# Patient Record
Sex: FEMALE | Employment: UNEMPLOYED | URBAN - METROPOLITAN AREA
[De-identification: names, ages, dates, MRNs, and addresses within clinical notes are randomized per-mention and may not be internally consistent; named-entity substitution may affect disease eponyms.]

---

## 2018-10-23 ENCOUNTER — HOSPITAL ENCOUNTER (OUTPATIENT)
Dept: LAB | Age: 58
Discharge: HOME | End: 2018-10-23
Payer: COMMERCIAL

## 2018-10-23 ENCOUNTER — OFFICE VISIT (OUTPATIENT)
Dept: INTERNAL MEDICINE CLINIC | Age: 58
End: 2018-10-23

## 2018-10-23 VITALS
WEIGHT: 149 LBS | RESPIRATION RATE: 16 BRPM | HEIGHT: 61 IN | TEMPERATURE: 97.9 F | HEART RATE: 74 BPM | BODY MASS INDEX: 28.13 KG/M2 | OXYGEN SATURATION: 99 % | SYSTOLIC BLOOD PRESSURE: 155 MMHG | DIASTOLIC BLOOD PRESSURE: 81 MMHG

## 2018-10-23 DIAGNOSIS — K63.5 POLYP OF COLON, UNSPECIFIED PART OF COLON, UNSPECIFIED TYPE: ICD-10-CM

## 2018-10-23 DIAGNOSIS — K21.9 GASTROESOPHAGEAL REFLUX DISEASE WITHOUT ESOPHAGITIS: ICD-10-CM

## 2018-10-23 DIAGNOSIS — I10 ESSENTIAL HYPERTENSION: ICD-10-CM

## 2018-10-23 DIAGNOSIS — Z78.0 POSTMENOPAUSAL: ICD-10-CM

## 2018-10-23 DIAGNOSIS — M54.50 LUMBAR SPINE PAIN: Primary | ICD-10-CM

## 2018-10-23 DIAGNOSIS — K57.90 DIVERTICULOSIS OF INTESTINE WITHOUT BLEEDING, UNSPECIFIED INTESTINAL TRACT LOCATION: ICD-10-CM

## 2018-10-23 DIAGNOSIS — I34.1 MITRAL VALVE PROLAPSE: ICD-10-CM

## 2018-10-23 DIAGNOSIS — Z90.710 STATUS POST HYSTERECTOMY: ICD-10-CM

## 2018-10-23 DIAGNOSIS — Z00.00 WELLNESS EXAMINATION: ICD-10-CM

## 2018-10-23 DIAGNOSIS — Z87.39 HISTORY OF HERNIATED INTERVERTEBRAL DISC: ICD-10-CM

## 2018-10-23 LAB
BASOPHILS # BLD: 0 K/UL (ref 0–0.1)
BASOPHILS NFR BLD: 0 % (ref 0–2)
DIFFERENTIAL METHOD BLD: ABNORMAL
EOSINOPHIL # BLD: 0.1 K/UL (ref 0–0.4)
EOSINOPHIL NFR BLD: 1 % (ref 0–5)
ERYTHROCYTE [DISTWIDTH] IN BLOOD BY AUTOMATED COUNT: 13.4 % (ref 11.6–14.5)
HCT VFR BLD AUTO: 40.4 % (ref 35–45)
HGB BLD-MCNC: 12.8 G/DL (ref 12–16)
LYMPHOCYTES # BLD: 2.7 K/UL (ref 0.9–3.6)
LYMPHOCYTES NFR BLD: 39 % (ref 21–52)
MCH RBC QN AUTO: 28.1 PG (ref 24–34)
MCHC RBC AUTO-ENTMCNC: 31.7 G/DL (ref 31–37)
MCV RBC AUTO: 88.8 FL (ref 74–97)
MONOCYTES # BLD: 0.3 K/UL (ref 0.05–1.2)
MONOCYTES NFR BLD: 4 % (ref 3–10)
NEUTS SEG # BLD: 3.9 K/UL (ref 1.8–8)
NEUTS SEG NFR BLD: 56 % (ref 40–73)
PLATELET # BLD AUTO: 277 K/UL (ref 135–420)
PMV BLD AUTO: 12.6 FL (ref 9.2–11.8)
RBC # BLD AUTO: 4.55 M/UL (ref 4.2–5.3)
WBC # BLD AUTO: 6.9 K/UL (ref 4.6–13.2)

## 2018-10-23 PROCEDURE — 85025 COMPLETE CBC W/AUTO DIFF WBC: CPT | Performed by: INTERNAL MEDICINE

## 2018-10-23 PROCEDURE — 80061 LIPID PANEL: CPT | Performed by: INTERNAL MEDICINE

## 2018-10-23 PROCEDURE — 82652 VIT D 1 25-DIHYDROXY: CPT | Performed by: INTERNAL MEDICINE

## 2018-10-23 PROCEDURE — 86803 HEPATITIS C AB TEST: CPT | Performed by: INTERNAL MEDICINE

## 2018-10-23 PROCEDURE — 80053 COMPREHEN METABOLIC PANEL: CPT | Performed by: INTERNAL MEDICINE

## 2018-10-23 PROCEDURE — 36415 COLL VENOUS BLD VENIPUNCTURE: CPT | Performed by: INTERNAL MEDICINE

## 2018-10-23 RX ORDER — PANTOPRAZOLE SODIUM 40 MG/1
40 TABLET, DELAYED RELEASE ORAL DAILY
COMMUNITY
End: 2018-10-23 | Stop reason: SDUPTHER

## 2018-10-23 RX ORDER — DILTIAZEM HYDROCHLORIDE 240 MG/1
240 CAPSULE, EXTENDED RELEASE ORAL
Refills: 1 | COMMUNITY
Start: 2018-09-23 | End: 2018-10-23 | Stop reason: SDUPTHER

## 2018-10-23 RX ORDER — PANTOPRAZOLE SODIUM 40 MG/1
40 TABLET, DELAYED RELEASE ORAL DAILY
Qty: 90 TAB | Refills: 3 | Status: SHIPPED | OUTPATIENT
Start: 2018-10-23

## 2018-10-23 RX ORDER — DICLOFENAC SODIUM 10 MG/G
2 GEL TOPICAL
Qty: 100 G | Refills: 1 | Status: SHIPPED | OUTPATIENT
Start: 2018-10-23 | End: 2018-11-06 | Stop reason: ALTCHOICE

## 2018-10-23 RX ORDER — DILTIAZEM HYDROCHLORIDE 240 MG/1
240 CAPSULE, EXTENDED RELEASE ORAL
Qty: 90 CAP | Refills: 1 | Status: SHIPPED | OUTPATIENT
Start: 2018-10-23
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
## 2018-10-23 NOTE — PROGRESS NOTES
ROOM # 1 Mary Lewis presents today for Chief Complaint Patient presents with  Back Pain Patient reports pain has been going on about a month. Worse in the morning. Tatyana OteroCharisse preferred language for health care discussion is english/other. Is someone accompanying this pt? Yes, daughter accompanied patient into exam room. Is the patient using any DME equipment during OV? No 
 
Depression Screening: PHQ over the last two weeks 10/23/2018 Little interest or pleasure in doing things Not at all Feeling down, depressed, irritable, or hopeless Not at all Total Score PHQ 2 0 Learning Assessment: 
No flowsheet data found. Abuse Screening: No flowsheet data found. Fall Risk No flowsheet data found. Visit Vitals /81 (BP 1 Location: Right arm, BP Patient Position: Sitting) Pulse 74 Temp 97.9 °F (36.6 °C) (Oral) Resp 16 Ht 5' 1\" (1.549 m) Wt 149 lb (67.6 kg) SpO2 99% BMI 28.15 kg/m² Health Maintenance reviewed and discussed per provider. Yes Mary Lewis is due for Health Maintenance Due Topic Date Due  
 Hepatitis C Screening  1960  
 DTaP/Tdap/Td series (1 - Tdap) 02/13/1982  PAP AKA CERVICAL CYTOLOGY  02/13/1982  Shingrix Vaccine Age 50> (1 of 2) 02/13/2011  BREAST CANCER SCRN MAMMOGRAM  02/13/2011  
 FOBT Q 1 YEAR AGE 50-75  02/13/2011  Influenza Age 5 to Adult  08/01/2018 Please order/place referral if appropriate. Advance Directive: 1. Do you have an advance directive in place? Patient Reply: No 
 
2. If not, would you like material regarding how to put one in place? Patient Reply: No 
 
Coordination of Care: 1. Have you been to the ER, urgent care clinic since your last visit? Hospitalized since your last visit? NO 
 
2. Have you seen or consulted any other health care providers outside of the [address] [place] [address] since your last visit?  Include any pap smears or colon screening.  No

## 2018-10-24 LAB
ALBUMIN SERPL-MCNC: 4.5 G/DL (ref 3.4–5)
ALBUMIN/GLOB SERPL: 1.2 {RATIO} (ref 0.8–1.7)
ALP SERPL-CCNC: 142 U/L (ref 45–117)
ALT SERPL-CCNC: 27 U/L (ref 13–56)
ANION GAP SERPL CALC-SCNC: 8 MMOL/L (ref 3–18)
AST SERPL-CCNC: 23 U/L (ref 15–37)
BILIRUB SERPL-MCNC: 0.2 MG/DL (ref 0.2–1)
BUN SERPL-MCNC: 11 MG/DL (ref 7–18)
BUN/CREAT SERPL: 13 (ref 12–20)
CALCIUM SERPL-MCNC: 9.3 MG/DL (ref 8.5–10.1)
CHLORIDE SERPL-SCNC: 106 MMOL/L (ref 100–108)
CHOLEST SERPL-MCNC: 175 MG/DL
CO2 SERPL-SCNC: 28 MMOL/L (ref 21–32)
CREAT SERPL-MCNC: 0.82 MG/DL (ref 0.6–1.3)
GLOBULIN SER CALC-MCNC: 3.7 G/DL (ref 2–4)
GLUCOSE SERPL-MCNC: 91 MG/DL (ref 74–99)
HCV AB SER IA-ACNC: <0.02 INDEX
HCV AB SERPL QL IA: NEGATIVE
HCV COMMENT,HCGAC: NORMAL
HDLC SERPL-MCNC: 57 MG/DL (ref 40–60)
HDLC SERPL: 3.1 {RATIO} (ref 0–5)
LDLC SERPL CALC-MCNC: 96 MG/DL (ref 0–100)
LIPID PROFILE,FLP: NORMAL
POTASSIUM SERPL-SCNC: 3.8 MMOL/L (ref 3.5–5.5)
PROT SERPL-MCNC: 8.2 G/DL (ref 6.4–8.2)
SODIUM SERPL-SCNC: 142 MMOL/L (ref 136–145)
TRIGL SERPL-MCNC: 110 MG/DL (ref ?–150)
VLDLC SERPL CALC-MCNC: 22 MG/DL

## 2018-10-25 LAB — 1,25(OH)2D3 SERPL-MCNC: 101 PG/ML (ref 19.9–79.3)

## 2018-10-26 ENCOUNTER — HOSPITAL ENCOUNTER (OUTPATIENT)
Dept: GENERAL RADIOLOGY | Age: 58
Discharge: HOME | End: 2018-10-26
Payer: COMMERCIAL

## 2018-10-26 DIAGNOSIS — Z87.39 HISTORY OF HERNIATED INTERVERTEBRAL DISC: ICD-10-CM

## 2018-10-26 DIAGNOSIS — M54.50 LUMBAR SPINE PAIN: ICD-10-CM

## 2018-10-26 PROCEDURE — 72100 X-RAY EXAM L-S SPINE 2/3 VWS: CPT

## 2018-11-06 ENCOUNTER — OFFICE VISIT (OUTPATIENT)
Dept: INTERNAL MEDICINE CLINIC | Age: 58
End: 2018-11-06

## 2018-11-06 VITALS
BODY MASS INDEX: 28.32 KG/M2 | WEIGHT: 150 LBS | DIASTOLIC BLOOD PRESSURE: 88 MMHG | RESPIRATION RATE: 14 BRPM | HEIGHT: 61 IN | SYSTOLIC BLOOD PRESSURE: 152 MMHG | OXYGEN SATURATION: 99 % | HEART RATE: 71 BPM | TEMPERATURE: 98.8 F

## 2018-11-06 DIAGNOSIS — M85.80 OSTEOPENIA, UNSPECIFIED LOCATION: ICD-10-CM

## 2018-11-06 DIAGNOSIS — I10 ESSENTIAL HYPERTENSION: ICD-10-CM

## 2018-11-06 DIAGNOSIS — M54.50 LUMBAR SPINE PAIN: Primary | ICD-10-CM

## 2018-11-06 RX ORDER — LOSARTAN POTASSIUM 25 MG/1
25 TABLET ORAL DAILY
Qty: 90 TAB | Refills: 3 | Status: SHIPPED | OUTPATIENT
Start: 2018-11-06

## 2018-11-06 NOTE — PROGRESS NOTES
ROOM # 11 Ananth Lewis presents today for Chief Complaint Patient presents with  Leg Pain Tatyana Lewis preferred language for health care discussion is english/other. Is someone accompanying this pt? Yes, daughter is with patient today. Is the patient using any DME equipment during OV? No 
 
Depression Screening: PHQ over the last two weeks 11/6/2018 10/23/2018 Little interest or pleasure in doing things Not at all Not at all Feeling down, depressed, irritable, or hopeless Not at all Not at all Total Score PHQ 2 0 0 Learning Assessment: 
No flowsheet data found. Abuse Screening: No flowsheet data found. Fall Risk No flowsheet data found. Visit Vitals /88 (BP 1 Location: Left arm, BP Patient Position: Sitting) Pulse 71 Temp 98.8 °F (37.1 °C) (Oral) Resp 14 Ht 5' 1\" (1.549 m) Wt 150 lb (68 kg) SpO2 99% BMI 28.34 kg/m² Health Maintenance reviewed and discussed per provider. Yes Ananth Lewis is due for Health Maintenance Due Topic Date Due  
 DTaP/Tdap/Td series (1 - Tdap) 02/13/1982  PAP AKA CERVICAL CYTOLOGY  02/13/1982  Shingrix Vaccine Age 50> (1 of 2) 02/13/2011  BREAST CANCER SCRN MAMMOGRAM  02/13/2011  
 FOBT Q 1 YEAR AGE 50-75  02/13/2011  Influenza Age 5 to Adult  08/01/2018 Please order/place referral if appropriate. Advance Directive: 1. Do you have an advance directive in place? Patient Reply: No 
 
2. If not, would you like material regarding how to put one in place? Patient Reply: No 
 
Coordination of Care: 1. Have you been to the ER, urgent care clinic since your last visit? Hospitalized since your last visit? No 
 
2. Have you seen or consulted any other health care providers outside of the [address] [place] [address] since your last visit? Include any pap smears or colon screening.  No

## 2018-11-06 NOTE — PROGRESS NOTES
HPI  
 
Higinio Garcia is a 63 y.o. female with relevant past medical history of HTN, mitral valve prolapse, migraine, chronic back pain, herniated lumbar disc, postmenopausal s/p hysterectomy, R elbow fracture s/p surgical repair, GERD, colon polyp, diverticulosis, here for follow up. Patient tells me she will be returning to [place] in a few days. She wanted to follow up on labs and X-ray. BP is noted slightly elevated today, re-checked twice in office after sitting down for a while in both arms, persistently above 150s/80s. Patient says she notices elevated BP multiple times a week, but it usually comes down after she lays down and rest, she believes it may be associated with anxiety at times. She reports adequate lumbar pain control with NSAIDs. Says her insurance would not cover the voltaren gel. She reports no HA, CP, SOB, dizziness, malaise, fever, chills, cold symptoms, or any other new symptoms. ROS As above included in HPI. Otherwise 11 point review of systems negative including constitutional, skin, HENT, eyes, respiratory, cardiovascular, gastrointestinal, genitourinary, musculoskeletal, endocrine, hematologic, allergy, and neurologic. Past Medical History Past Medical History:  
Diagnosis Date  Hypertension  Migraine Past Surgical History:  
Procedure Laterality Date  HX APPENDECTOMY  HX CHOLECYSTECTOMY  HX GYN    
 hysterectomy adhesions Family History Family History Problem Relation Age of Onset  Heart Disease Mother  Heart Attack Mother  Diabetes Father  Diabetes Brother  Cancer Paternal Uncle  Diabetes Brother Social History She  reports that she has quit smoking. she has never used smokeless tobacco.  
Social History Substance and Sexual Activity Alcohol Use Yes  Frequency: Never Comment: socially Immunization History There is no immunization history on file for this patient. Allergies Allergies Allergen Reactions  Codeine Palpitations Medications Current Outpatient Medications Medication Sig  
 losartan (COZAAR) 25 mg tablet Take 1 Tab by mouth daily.  diltiazem (TIAZAC) 240 mg SR capsule Take 1 Cap by mouth once over twenty-four (24) hours.  pantoprazole (PROTONIX) 40 mg tablet Take 1 Tab by mouth daily. No current facility-administered medications for this visit. Visit Vitals /88 (BP 1 Location: Left arm, BP Patient Position: Sitting) Pulse 71 Temp 98.8 °F (37.1 °C) (Oral) Resp 14 Ht 5' 1\" (1.549 m) Wt 150 lb (68 kg) LMP  (LMP Unknown) SpO2 99% BMI 28.34 kg/m² Body mass index is 28.34 kg/m². Physical Exam  
Constitutional: She is oriented to person, place, and time and well-developed, well-nourished, and in no distress. HENT:  
Head: Normocephalic and atraumatic. Right Ear: External ear normal.  
Left Ear: External ear normal.  
Nose: Nose normal.  
Mouth/Throat: Oropharynx is clear and moist.  
Eyes: EOM are normal. Pupils are equal, round, and reactive to light. Neck: Normal range of motion. Neck supple. Cardiovascular: Normal rate, regular rhythm, normal heart sounds and intact distal pulses. Pulmonary/Chest: Effort normal and breath sounds normal. No respiratory distress. Abdominal: Soft. Bowel sounds are normal.  
Musculoskeletal: Normal range of motion. She exhibits no edema. R shoulder unable to extend completely, by -10 degrees. H/o fracture. Neurological: She is alert and oriented to person, place, and time. Skin: Skin is warm. No rash noted. No erythema. No pallor. Psychiatric: Mood and affect normal.  
Nursing note and vitals reviewed. REVIEW OF DATA Labs Hospital Outpatient Visit on 10/23/2018 Component Date Value Ref Range Status  LIPID PROFILE 10/23/2018        Final  
 Cholesterol, total 10/23/2018 175  <200 MG/DL Final  
 Triglyceride 10/23/2018 110  <150 MG/DL Final  
  HDL Cholesterol 10/23/2018 57  40 - 60 MG/DL Final  
 LDL, calculated 10/23/2018 96  0 - 100 MG/DL Final  
 VLDL, calculated 10/23/2018 22  MG/DL Final  
 CHOL/HDL Ratio 10/23/2018 3.1  0 - 5.0   Final  
 WBC 10/23/2018 6.9  4.6 - 13.2 K/uL Final  
 RBC 10/23/2018 4.55  4.20 - 5.30 M/uL Final  
 HGB 10/23/2018 12.8  12.0 - 16.0 g/dL Final  
 HCT 10/23/2018 40.4  35.0 - 45.0 % Final  
 MCV 10/23/2018 88.8  74.0 - 97.0 FL Final  
 MCH 10/23/2018 28.1  24.0 - 34.0 PG Final  
 MCHC 10/23/2018 31.7  31.0 - 37.0 g/dL Final  
 RDW 10/23/2018 13.4  11.6 - 14.5 % Final  
 PLATELET 71/60/3542 798  135 - 420 K/uL Final  
 MPV 10/23/2018 12.6* 9.2 - 11.8 FL Final  
 NEUTROPHILS 10/23/2018 56  40 - 73 % Final  
 LYMPHOCYTES 10/23/2018 39  21 - 52 % Final  
 MONOCYTES 10/23/2018 4  3 - 10 % Final  
 EOSINOPHILS 10/23/2018 1  0 - 5 % Final  
 BASOPHILS 10/23/2018 0  0 - 2 % Final  
 ABS. NEUTROPHILS 10/23/2018 3.9  1.8 - 8.0 K/UL Final  
 ABS. LYMPHOCYTES 10/23/2018 2.7  0.9 - 3.6 K/UL Final  
 ABS. MONOCYTES 10/23/2018 0.3  0.05 - 1.2 K/UL Final  
 ABS. EOSINOPHILS 10/23/2018 0.1  0.0 - 0.4 K/UL Final  
 ABS.  BASOPHILS 10/23/2018 0.0  0.0 - 0.1 K/UL Final  
 DF 10/23/2018 AUTOMATED    Final  
 Sodium 10/23/2018 142  136 - 145 mmol/L Final  
 Potassium 10/23/2018 3.8  3.5 - 5.5 mmol/L Final  
 Chloride 10/23/2018 106  100 - 108 mmol/L Final  
 CO2 10/23/2018 28  21 - 32 mmol/L Final  
 Anion gap 10/23/2018 8  3.0 - 18 mmol/L Final  
 Glucose 10/23/2018 91  74 - 99 mg/dL Final  
 BUN 10/23/2018 11  7.0 - 18 MG/DL Final  
 Creatinine 10/23/2018 0.82  0.6 - 1.3 MG/DL Final  
 BUN/Creatinine ratio 10/23/2018 13  12 - 20   Final  
 GFR est AA 10/23/2018 >60  >60 ml/min/1.73m2 Final  
 GFR est non-AA 10/23/2018 >60  >60 ml/min/1.73m2 Final  
 Calcium 10/23/2018 9.3  8.5 - 10.1 MG/DL Final  
 Bilirubin, total 10/23/2018 0.2  0.2 - 1.0 MG/DL Final  
 ALT (SGPT) 10/23/2018 27  13 - 56 U/L Final  
  AST (SGOT) 10/23/2018 23  15 - 37 U/L Final  
 Alk. phosphatase 10/23/2018 142* 45 - 117 U/L Final  
 Protein, total 10/23/2018 8.2  6.4 - 8.2 g/dL Final  
 Albumin 10/23/2018 4.5  3.4 - 5.0 g/dL Final  
 Globulin 10/23/2018 3.7  2.0 - 4.0 g/dL Final  
 A-G Ratio 10/23/2018 1.2  0.8 - 1.7   Final  
 Hepatitis C virus Ab 10/23/2018 <0.02  <0.80 Index Final  
 Hep C  virus Ab Interp. 10/23/2018 NEGATIVE   NEG   Final  
 Hep C  virus Ab comment 10/23/2018        Final  
 Calcitriol (Vit D 1, 25 di-OH) 10/23/2018 101.0* 19.9 - 79.3 pg/mL Final  
 
 
 
CT Results (most recent): No results found for this or any previous visit. XR Results (most recent): 
Results from Hospital Encounter encounter on 10/26/18 XR SPINE LUMB 2 OR 3 V Narrative EXAM: XR SPINE LUMB 2 OR 3 V 
 
INDICATION: 63 years Female. acute on chronic pain, h/o herniated disc. ADDITIONAL HISTORY: None, COMPARISON: No comparison study is available at the time of this dictation. TECHNIQUE: 3 views of the lumbar spine. FINDINGS: 
Bone mineralization is mildly decreased. There are 5 lumbar-type vertebral 
bodies. Vertebral body heights are preserved. There is no spondylolisthesis. Mild 
multilevel degenerative disc disease is present at all lumbar levels, most 
pronounced at L3/L4. There is mild mid and lower lumbar facet arthrosis. Vascular calcifications are noted. The sacroiliac joints are relatively preserved. Portions of the sacrum are 
obscured by overlying bowel contents. There is mild left hip arthrosis. Impression IMPRESSION: 
1. Mild multilevel degenerative disc disease, most pronounced at L3/L4. Mild 
multilevel facet arthrosis. 2.  Mild generalized osteopenia. CT All Micro Results None DIAGNOSIS AND PLAN There is no problem list on file for this patient. 1. Lumbar spine pain 2. Osteopenia Likely mechanical, musculo-skeletal, OA, h/o herniated disc, no radiculopathy. May take ibuprofen PRN recommended with meals due to h/o gastritis. Recommended to avoid heavy lifting and continued repeated low back abduction/extension as it may worsen symptoms. Depending on symptoms and X-ray findings consider referral to ortho/PT. X-ray from 10/26/18 showed \"Mild multilevel degenerative disc disease, most pronounced at L3/L4. Mild multilevel facet arthrosis. \" 
Results and copy of report given to patient to take with her for her next PCP. The patient is going back to [place] on 11/17/18. Voltaren not approved by insurance. Recommended topical arnica in addition to NSAIDs PRN. Recommended to take vit D and Calcium daily. (Vit D dose of no more than 1000 units daily) 3 Essential hypertension C/w diltiazem as prescribed in AM 
Added losartan 25 mg low dose to take PRN at night if BP >130/90 CBC, CMP, lipid panel all WNR Follow-up Disposition: 
Return if symptoms worsen or fail to improve. Thuy Hughes MD

## 2023-07-12 ENCOUNTER — HOSPITAL ENCOUNTER (OUTPATIENT)
Dept: HOSPITAL 93 - CIR.AMB | Age: 63
Discharge: HOME | End: 2023-07-12
Attending: SURGERY
Payer: COMMERCIAL

## 2023-07-12 VITALS — WEIGHT: 151 LBS | HEIGHT: 62 IN | BODY MASS INDEX: 27.79 KG/M2

## 2023-07-12 DIAGNOSIS — I10: ICD-10-CM

## 2023-07-12 DIAGNOSIS — D01.3: Primary | ICD-10-CM

## 2023-07-12 DIAGNOSIS — Z20.822: ICD-10-CM

## 2023-07-12 DIAGNOSIS — D12.8: ICD-10-CM

## 2023-07-12 DIAGNOSIS — K62.89: ICD-10-CM
